# Patient Record
(demographics unavailable — no encounter records)

---

## 2024-11-15 NOTE — PHYSICAL EXAM
08/10/24 1146   Final Note   Assessment Type Final Discharge Note   Anticipated Discharge Disposition Home   What phone number can be called within the next 1-3 days to see how you are doing after discharge? [phone]   Post-Acute Status   Discharge Delays None known at this time     Discharge orders and chart reviewed with no further post-acute discharge needs identified at this time.  At this time, patient is cleared for discharge from Case Management standpoint.        [Well Developed] : well developed [Well Nourished] : well nourished [No Acute Distress] : no acute distress [Normal Conjunctiva] : normal conjunctiva [Normal Venous Pressure] : normal venous pressure [No Carotid Bruit] : no carotid bruit [Normal S1, S2] : normal S1, S2 [No Murmur] : no murmur [No Rub] : no rub [Clear Lung Fields] : clear lung fields [Good Air Entry] : good air entry [No Respiratory Distress] : no respiratory distress  [Soft] : abdomen soft [Non Tender] : non-tender [No Masses/organomegaly] : no masses/organomegaly [Normal Bowel Sounds] : normal bowel sounds [Normal Gait] : normal gait [No Edema] : no edema [No Cyanosis] : no cyanosis [No Clubbing] : no clubbing [No Varicosities] : no varicosities [No Rash] : no rash [No Skin Lesions] : no skin lesions [Moves all extremities] : moves all extremities [No Focal Deficits] : no focal deficits [Normal Speech] : normal speech [Alert and Oriented] : alert and oriented [Normal memory] : normal memory [No Gallop] : no gallop

## 2024-11-15 NOTE — PHYSICAL EXAM
[Well Developed] : well developed [Well Nourished] : well nourished [No Acute Distress] : no acute distress [Normal Conjunctiva] : normal conjunctiva [Normal Venous Pressure] : normal venous pressure [No Carotid Bruit] : no carotid bruit [Normal S1, S2] : normal S1, S2 [No Murmur] : no murmur [No Rub] : no rub [Clear Lung Fields] : clear lung fields [Good Air Entry] : good air entry [No Respiratory Distress] : no respiratory distress  [Soft] : abdomen soft [Non Tender] : non-tender [No Masses/organomegaly] : no masses/organomegaly [Normal Bowel Sounds] : normal bowel sounds [Normal Gait] : normal gait [No Edema] : no edema 149.9 [No Cyanosis] : no cyanosis [No Clubbing] : no clubbing [No Varicosities] : no varicosities [No Rash] : no rash [No Skin Lesions] : no skin lesions [Moves all extremities] : moves all extremities [No Focal Deficits] : no focal deficits [Normal Speech] : normal speech [Alert and Oriented] : alert and oriented [Normal memory] : normal memory [No Gallop] : no gallop

## 2024-11-15 NOTE — HISTORY OF PRESENT ILLNESS
[FreeTextEntry1] : Ashanti 66yo lady with HTN, HL and DM;  here for eval of intermittent chest pain and tachycardia. EKG sinus tach 116bpm   11/14/2024 Pt is here today as a f/u to review most recent cardiology testing  normal nuc stress normal carotid doppler normal echo  labs were noted for tri: 227, hdl: 44, LDL: 96, Non HDL : 134, ascvd: mod risk,  start statin  no chest pain

## 2024-11-15 NOTE — DISCUSSION/SUMMARY
[FreeTextEntry1] : Ashanti 66yo lady with HTN, HL and DM;  here for eval of intermittent chest pain and tachycardia. No dyspnea  Most recent cardiac testing were all normal  discussed in detail with patient regarding modification of current diet plan, emphasized mediterrenean  added statin 20mg to current regimen  repeat labs in 3-4 months

## 2024-11-15 NOTE — DISCUSSION/SUMMARY
[FreeTextEntry1] : Ashanti 64yo lady with HTN, HL and DM;  here for eval of intermittent chest pain and tachycardia. No dyspnea  Most recent cardiac testing were all normal  discussed in detail with patient regarding modification of current diet plan, emphasized mediterrenean  added statin 20mg to current regimen  repeat labs in 3-4 months

## 2024-11-15 NOTE — HISTORY OF PRESENT ILLNESS
[FreeTextEntry1] : Ashanti 64yo lady with HTN, HL and DM;  here for eval of intermittent chest pain and tachycardia. EKG sinus tach 116bpm   11/14/2024 Pt is here today as a f/u to review most recent cardiology testing  normal nuc stress normal carotid doppler normal echo  labs were noted for tri: 227, hdl: 44, LDL: 96, Non HDL : 134, ascvd: mod risk,  start statin  no chest pain